# Patient Record
Sex: FEMALE | Race: WHITE | Employment: OTHER | URBAN - METROPOLITAN AREA
[De-identification: names, ages, dates, MRNs, and addresses within clinical notes are randomized per-mention and may not be internally consistent; named-entity substitution may affect disease eponyms.]

---

## 2020-10-06 ENCOUNTER — HOSPITAL ENCOUNTER (EMERGENCY)
Age: 25
Discharge: HOME | End: 2020-10-06
Payer: MEDICAID

## 2020-10-06 VITALS
OXYGEN SATURATION: 95 % | WEIGHT: 130 LBS | BODY MASS INDEX: 19.7 KG/M2 | HEART RATE: 82 BPM | HEIGHT: 68 IN | SYSTOLIC BLOOD PRESSURE: 124 MMHG | RESPIRATION RATE: 14 BRPM | DIASTOLIC BLOOD PRESSURE: 84 MMHG | TEMPERATURE: 97.6 F

## 2020-10-06 PROCEDURE — 99282 EMERGENCY DEPT VISIT SF MDM: CPT

## 2020-10-06 PROCEDURE — 90471 IMMUNIZATION ADMIN: CPT | Performed by: NURSE PRACTITIONER

## 2020-10-06 PROCEDURE — 99283 EMERGENCY DEPT VISIT LOW MDM: CPT

## 2020-10-06 PROCEDURE — 12001 RPR S/N/AX/GEN/TRNK 2.5CM/<: CPT

## 2020-10-06 PROCEDURE — 6370000000 HC RX 637 (ALT 250 FOR IP): Performed by: NURSE PRACTITIONER

## 2020-10-06 PROCEDURE — 6360000002 HC RX W HCPCS: Performed by: NURSE PRACTITIONER

## 2020-10-06 PROCEDURE — 90715 TDAP VACCINE 7 YRS/> IM: CPT | Performed by: NURSE PRACTITIONER

## 2020-10-06 PROCEDURE — 2500000003 HC RX 250 WO HCPCS: Performed by: NURSE PRACTITIONER

## 2020-10-06 RX ORDER — LIDOCAINE HYDROCHLORIDE 10 MG/ML
5 INJECTION, SOLUTION INFILTRATION; PERINEURAL ONCE
Status: COMPLETED | OUTPATIENT
Start: 2020-10-06 | End: 2020-10-06

## 2020-10-06 RX ORDER — DIAPER,BRIEF,INFANT-TODD,DISP
EACH MISCELLANEOUS ONCE
Status: COMPLETED | OUTPATIENT
Start: 2020-10-06 | End: 2020-10-06

## 2020-10-06 RX ADMIN — LIDOCAINE HYDROCHLORIDE 5 ML: 10 INJECTION, SOLUTION INFILTRATION; PERINEURAL at 21:50

## 2020-10-06 RX ADMIN — TETANUS TOXOID, REDUCED DIPHTHERIA TOXOID AND ACELLULAR PERTUSSIS VACCINE, ADSORBED 0.5 ML: 5; 2.5; 8; 8; 2.5 SUSPENSION INTRAMUSCULAR at 21:51

## 2020-10-06 RX ADMIN — BACITRACIN ZINC: 500 OINTMENT TOPICAL at 21:51

## 2020-10-06 SDOH — HEALTH STABILITY: MENTAL HEALTH: HOW OFTEN DO YOU HAVE A DRINK CONTAINING ALCOHOL?: NEVER

## 2020-10-06 ASSESSMENT — PAIN SCALES - GENERAL
PAINLEVEL_OUTOF10: 9
PAINLEVEL_OUTOF10: 9

## 2020-10-06 ASSESSMENT — PAIN DESCRIPTION - PAIN TYPE: TYPE: ACUTE PAIN

## 2020-10-06 ASSESSMENT — PAIN DESCRIPTION - DESCRIPTORS: DESCRIPTORS: CRUSHING

## 2020-10-06 ASSESSMENT — PAIN DESCRIPTION - FREQUENCY: FREQUENCY: CONTINUOUS

## 2020-10-06 ASSESSMENT — PAIN DESCRIPTION - ORIENTATION: ORIENTATION: RIGHT

## 2020-10-06 ASSESSMENT — PAIN DESCRIPTION - LOCATION: LOCATION: HAND

## 2020-10-07 NOTE — ED PROVIDER NOTES
Department of Emergency Medicine  ED Provider Note  Admit Date: 10/6/2020  Room: [address] [place] [address]      Independent       HPI:  10/6/20, Time: 9:45 PM EDT  . Elli De Luna is a 30 y.o. old female presenting to the emergency department for a laceration to the top of the right hand, caused by knife, dirty, which occured 1 hour(s) prior to arrival. There is not a possibility of retained foreign body in the affected area. The patients tetanus status is unknown. Bleeding is  controlled. There is no pain at injury site. The injury was not work related. Patient presents to the emergency department with laceration to the top of the right hand. Patient reports that she was trying to opening up a can but did not have a can opener due to recently moving so she used a knife causing her to slide and struck the top of her right hand. Bleeding controlled on arrival here. Patient unaware when her last tetanus immunization was. Review of Systems:   Pertinent positives and negatives are stated within HPI, all other systems reviewed and are negative.          --------------------------------------------- PAST HISTORY ---------------------------------------------  Past Medical History:  has no past medical history on file. Past Surgical History:  has no past surgical history on file. Social History:  reports that she has never smoked. She has never used smokeless tobacco. She reports that she does not drink alcohol or use drugs. Family History: family history is not on file. The patients home medications have been reviewed. Allergies: Patient has no known allergies. -------------------------------------------------- RESULTS -------------------------------------------------  All laboratory and radiology results have been personally reviewed by myself   LABS:  No results found for this visit on 10/06/20.     RADIOLOGY:  Interpreted by Radiologist.  No orders to display       ------------------------- NURSING NOTES AND VITALS REVIEWED ---------------------------   The nursing notes within the ED encounter and vital signs as below have been reviewed. /84   Pulse 82   Temp 97.6 °F (36.4 °C) (Temporal)   Resp 14   Ht 5' 8\" (1.727 m)   Wt 130 lb (59 kg)   SpO2 95%   BMI 19.77 kg/m²   Oxygen Saturation Interpretation: Normal      ---------------------------------------------------PHYSICAL EXAM--------------------------------------      Constitutional/General: Alert and oriented x3, well appearing, non toxic in NAD  Head: Normocephalic and atraumatic  Eyes: PERRL, EOMI  Mouth: Oropharynx clear, handling secretions, no trismus  Neck: Supple, full ROM,   Pulmonary: Lungs clear to auscultation bilaterally, no wheezes, rales, or rhonchi. Not in respiratory distress  Cardiovascular:  Regular rate and rhythm, no murmurs, gallops, or rubs. 2+ distal pulses  Abdomen: Soft, non tender, non distended,   Extremities: Moves all extremities x 4. Warm and well perfused  Skin: warm and dry without rash. There is a laceration of the top of the right hand, which measures 1cm. It is a partial nail thickness laceration. There is no evidence of foreign body or gross contamination. Neurologic: GCS 15,  Psych: Normal Affect      ------------------------------ ED COURSE/MEDICAL DECISION MAKING----------------------  Medications   Tetanus-Diphth-Acell Pertussis (BOOSTRIX) injection 0.5 mL (0.5 mLs Intramuscular Given 10/6/20 2151)   lidocaine 1 % injection 5 mL (5 mLs Intradermal Given 10/6/20 2150)   bacitracin zinc ointment ( Topical Given 10/6/20 5493)             LACERATION REPAIR  PROCEDURE NOTE:  Unless otherwise indicated, this procedure was performed by Ruth Ann Queen CNP       Laceration #: 1. Location: Dorsum of the right hand  Length: 1cm. The wound area was irrigated with sterile saline, cleansend with shur-clens and draped in a sterile fashion.   The wound area was anesthetized with Lidocaine 1% without epinephrine. WOUND COMPLEXITY:    Debridement: partial thickness and None. Undermining: partial thickness and None. Wound Margins Revised: yes. Flaps Aligned: yes. The wound was explored with the following results No foreign bodies found, no foreign body or tendon injury seen. The wound was closed with 4-0 Prolene using interrupted sutures. Dressing:  bacitracin and a sterile dressing was placed. Total number suture: 2      Medical Decision Making: . Patient presents to the emergency department with laceration to the top of the right hand. Patient reports that she was trying to opening up a can but did not have a can opener due to recently moving so she used a knife causing her to slide and struck the top of her right hand. Bleeding controlled on arrival here. Patient unaware when her last tetanus immunization was. Patient with suture repair completed by this provider, 2 sutures placed. Patient educated on daily wound care, signs symptoms of infection suture removal all with understanding. She can take Motrin or Tylenol for additional pain relief. Patient also neurovascular intact she does have full motor movement no unusual pain or paresthesia noted. Patient expressed understanding and safely discharged home            Counseling: The emergency provider has spoken with the patient and discussed todays results, in addition to providing specific details for the plan of care and counseling regarding the diagnosis and prognosis. Questions are answered at this time and they are agreeable with the plan.      --------------------------------- IMPRESSION AND DISPOSITION ---------------------------------    IMPRESSION  1. Laceration of right hand without foreign body, initial encounter    2.  Need for tetanus booster        DISPOSITION  Disposition: Discharge to home  Patient condition is good         Verner Rotunda, APRN - CNP  10/07/20 0403  ATTENDING PROVIDER ATTESTATION:     Supervising Physician, on-site, available for consultation, non-participatory in the evaluation or care of this patient       Renzo Harmon MD  10/07/20 1093

## 2022-01-06 ENCOUNTER — HOSPITAL ENCOUNTER (INPATIENT)
Age: 27
LOS: 4 days | Discharge: HOME | DRG: 753 | End: 2022-01-10
Attending: EMERGENCY MEDICINE | Admitting: PSYCHIATRY & NEUROLOGY
Payer: COMMERCIAL

## 2022-01-06 ENCOUNTER — APPOINTMENT (OUTPATIENT)
Dept: GENERAL RADIOLOGY | Age: 27
DRG: 753 | End: 2022-01-06
Payer: COMMERCIAL

## 2022-01-06 DIAGNOSIS — R45.851 SUICIDAL IDEATION: Primary | ICD-10-CM

## 2022-01-06 DIAGNOSIS — N63.0 BREAST MASS IN FEMALE: ICD-10-CM

## 2022-01-06 DIAGNOSIS — S16.1XXA ACUTE STRAIN OF NECK MUSCLE, INITIAL ENCOUNTER: ICD-10-CM

## 2022-01-06 DIAGNOSIS — S39.012A STRAIN OF LUMBAR REGION, INITIAL ENCOUNTER: ICD-10-CM

## 2022-01-06 LAB
ACETAMINOPHEN LEVEL: <5 MCG/ML (ref 10–30)
ALBUMIN SERPL-MCNC: 4.3 G/DL (ref 3.5–5.2)
ALP BLD-CCNC: 62 U/L (ref 35–104)
ALT SERPL-CCNC: 9 U/L (ref 0–32)
AMPHETAMINE SCREEN, URINE: NOT DETECTED
ANION GAP SERPL CALCULATED.3IONS-SCNC: 11 MMOL/L (ref 7–16)
AST SERPL-CCNC: 10 U/L (ref 0–31)
BACTERIA: ABNORMAL /HPF
BARBITURATE SCREEN URINE: NOT DETECTED
BASOPHILS ABSOLUTE: 0.03 E9/L (ref 0–0.2)
BASOPHILS RELATIVE PERCENT: 0.3 % (ref 0–2)
BENZODIAZEPINE SCREEN, URINE: NOT DETECTED
BILIRUB SERPL-MCNC: 0.3 MG/DL (ref 0–1.2)
BILIRUBIN URINE: NEGATIVE
BLOOD, URINE: ABNORMAL
BUN BLDV-MCNC: 8 MG/DL (ref 6–20)
CALCIUM SERPL-MCNC: 8.9 MG/DL (ref 8.6–10.2)
CANNABINOID SCREEN URINE: POSITIVE
CHLORIDE BLD-SCNC: 109 MMOL/L (ref 98–107)
CLARITY: ABNORMAL
CO2: 20 MMOL/L (ref 22–29)
COCAINE METABOLITE SCREEN URINE: NOT DETECTED
COLOR: YELLOW
CREAT SERPL-MCNC: 0.7 MG/DL (ref 0.5–1)
EOSINOPHILS ABSOLUTE: 0.07 E9/L (ref 0.05–0.5)
EOSINOPHILS RELATIVE PERCENT: 0.8 % (ref 0–6)
ETHANOL: <10 MG/DL (ref 0–0.08)
FENTANYL SCREEN, URINE: NOT DETECTED
GFR AFRICAN AMERICAN: >60
GFR NON-AFRICAN AMERICAN: >60 ML/MIN/1.73
GLUCOSE BLD-MCNC: 99 MG/DL (ref 74–99)
GLUCOSE URINE: NEGATIVE MG/DL
HCG, URINE, POC: NEGATIVE
HCT VFR BLD CALC: 43.4 % (ref 34–48)
HEMOGLOBIN: 14.9 G/DL (ref 11.5–15.5)
IMMATURE GRANULOCYTES #: 0.03 E9/L
IMMATURE GRANULOCYTES %: 0.3 % (ref 0–5)
INFLUENZA A: NOT DETECTED
INFLUENZA B: NOT DETECTED
KETONES, URINE: NEGATIVE MG/DL
LEUKOCYTE ESTERASE, URINE: NEGATIVE
LYMPHOCYTES ABSOLUTE: 1.67 E9/L (ref 1.5–4)
LYMPHOCYTES RELATIVE PERCENT: 18.5 % (ref 20–42)
Lab: ABNORMAL
Lab: NORMAL
MCH RBC QN AUTO: 31.8 PG (ref 26–35)
MCHC RBC AUTO-ENTMCNC: 34.3 % (ref 32–34.5)
MCV RBC AUTO: 92.5 FL (ref 80–99.9)
METHADONE SCREEN, URINE: NOT DETECTED
MONOCYTES ABSOLUTE: 0.71 E9/L (ref 0.1–0.95)
MONOCYTES RELATIVE PERCENT: 7.9 % (ref 2–12)
NEGATIVE QC PASS/FAIL: NORMAL
NEUTROPHILS ABSOLUTE: 6.5 E9/L (ref 1.8–7.3)
NEUTROPHILS RELATIVE PERCENT: 72.2 % (ref 43–80)
NITRITE, URINE: NEGATIVE
OPIATE SCREEN URINE: NOT DETECTED
OXYCODONE URINE: NOT DETECTED
PDW BLD-RTO: 12.7 FL (ref 11.5–15)
PH UA: 7.5 (ref 5–9)
PHENCYCLIDINE SCREEN URINE: NOT DETECTED
PLATELET # BLD: 294 E9/L (ref 130–450)
PMV BLD AUTO: 10 FL (ref 7–12)
POSITIVE QC PASS/FAIL: NORMAL
POTASSIUM SERPL-SCNC: 4 MMOL/L (ref 3.5–5)
PROTEIN UA: ABNORMAL MG/DL
RBC # BLD: 4.69 E12/L (ref 3.5–5.5)
RBC UA: ABNORMAL /HPF (ref 0–2)
SALICYLATE, SERUM: <0.3 MG/DL (ref 0–30)
SARS-COV-2 RNA, RT PCR: NOT DETECTED
SODIUM BLD-SCNC: 140 MMOL/L (ref 132–146)
SPECIFIC GRAVITY UA: 1.01 (ref 1–1.03)
TOTAL PROTEIN: 7.2 G/DL (ref 6.4–8.3)
TRICYCLIC ANTIDEPRESSANTS SCREEN SERUM: NEGATIVE NG/ML
TROPONIN, HIGH SENSITIVITY: <6 NG/L (ref 0–9)
UROBILINOGEN, URINE: 0.2 E.U./DL
WBC # BLD: 9 E9/L (ref 4.5–11.5)
WBC UA: ABNORMAL /HPF (ref 0–5)

## 2022-01-06 PROCEDURE — 6370000000 HC RX 637 (ALT 250 FOR IP): Performed by: EMERGENCY MEDICINE

## 2022-01-06 PROCEDURE — 93005 ELECTROCARDIOGRAM TRACING: CPT

## 2022-01-06 PROCEDURE — 80143 DRUG ASSAY ACETAMINOPHEN: CPT

## 2022-01-06 PROCEDURE — 80053 COMPREHEN METABOLIC PANEL: CPT

## 2022-01-06 PROCEDURE — 85025 COMPLETE CBC W/AUTO DIFF WBC: CPT

## 2022-01-06 PROCEDURE — 82077 ASSAY SPEC XCP UR&BREATH IA: CPT

## 2022-01-06 PROCEDURE — 72110 X-RAY EXAM L-2 SPINE 4/>VWS: CPT

## 2022-01-06 PROCEDURE — 81001 URINALYSIS AUTO W/SCOPE: CPT

## 2022-01-06 PROCEDURE — 99285 EMERGENCY DEPT VISIT HI MDM: CPT

## 2022-01-06 PROCEDURE — 72050 X-RAY EXAM NECK SPINE 4/5VWS: CPT

## 2022-01-06 PROCEDURE — 6370000000 HC RX 637 (ALT 250 FOR IP)

## 2022-01-06 PROCEDURE — 80307 DRUG TEST PRSMV CHEM ANLYZR: CPT

## 2022-01-06 PROCEDURE — 80179 DRUG ASSAY SALICYLATE: CPT

## 2022-01-06 PROCEDURE — 87636 SARSCOV2 & INF A&B AMP PRB: CPT

## 2022-01-06 PROCEDURE — 84484 ASSAY OF TROPONIN QUANT: CPT

## 2022-01-06 PROCEDURE — 1240000000 HC EMOTIONAL WELLNESS R&B

## 2022-01-06 RX ORDER — HALOPERIDOL 5 MG
5 TABLET ORAL EVERY 6 HOURS PRN
Status: DISCONTINUED | OUTPATIENT
Start: 2022-01-06 | End: 2022-01-10 | Stop reason: HOSPADM

## 2022-01-06 RX ORDER — TRAZODONE HYDROCHLORIDE 50 MG/1
50 TABLET ORAL NIGHTLY PRN
Status: DISCONTINUED | OUTPATIENT
Start: 2022-01-06 | End: 2022-01-10 | Stop reason: HOSPADM

## 2022-01-06 RX ORDER — ACETAMINOPHEN 325 MG/1
650 TABLET ORAL EVERY 6 HOURS PRN
Status: DISCONTINUED | OUTPATIENT
Start: 2022-01-06 | End: 2022-01-06 | Stop reason: SDUPTHER

## 2022-01-06 RX ORDER — NICOTINE 21 MG/24HR
1 PATCH, TRANSDERMAL 24 HOURS TRANSDERMAL DAILY
Status: DISCONTINUED | OUTPATIENT
Start: 2022-01-07 | End: 2022-01-07 | Stop reason: SDUPTHER

## 2022-01-06 RX ORDER — IBUPROFEN 600 MG/1
600 TABLET ORAL EVERY 6 HOURS PRN
Status: DISCONTINUED | OUTPATIENT
Start: 2022-01-06 | End: 2022-01-10 | Stop reason: HOSPADM

## 2022-01-06 RX ORDER — MAGNESIUM HYDROXIDE/ALUMINUM HYDROXICE/SIMETHICONE 120; 1200; 1200 MG/30ML; MG/30ML; MG/30ML
30 SUSPENSION ORAL PRN
Status: DISCONTINUED | OUTPATIENT
Start: 2022-01-06 | End: 2022-01-10 | Stop reason: HOSPADM

## 2022-01-06 RX ORDER — TRAZODONE HYDROCHLORIDE 50 MG/1
50 TABLET ORAL NIGHTLY PRN
Status: DISCONTINUED | OUTPATIENT
Start: 2022-01-07 | End: 2022-01-06

## 2022-01-06 RX ORDER — HYDROXYZINE PAMOATE 50 MG/1
50 CAPSULE ORAL 3 TIMES DAILY PRN
Status: DISCONTINUED | OUTPATIENT
Start: 2022-01-06 | End: 2022-01-10 | Stop reason: HOSPADM

## 2022-01-06 RX ORDER — IBUPROFEN 400 MG/1
600 TABLET ORAL ONCE
Status: COMPLETED | OUTPATIENT
Start: 2022-01-06 | End: 2022-01-06

## 2022-01-06 RX ORDER — HALOPERIDOL 5 MG/ML
5 INJECTION INTRAMUSCULAR EVERY 6 HOURS PRN
Status: DISCONTINUED | OUTPATIENT
Start: 2022-01-06 | End: 2022-01-10 | Stop reason: HOSPADM

## 2022-01-06 RX ORDER — ACETAMINOPHEN 500 MG
1000 TABLET ORAL ONCE
Status: COMPLETED | OUTPATIENT
Start: 2022-01-06 | End: 2022-01-06

## 2022-01-06 RX ADMIN — IBUPROFEN 600 MG: 400 TABLET, FILM COATED ORAL at 21:09

## 2022-01-06 RX ADMIN — ACETAMINOPHEN 1000 MG: 500 TABLET ORAL at 16:00

## 2022-01-06 ASSESSMENT — PAIN SCALES - GENERAL
PAINLEVEL_OUTOF10: 8
PAINLEVEL_OUTOF10: 7
PAINLEVEL_OUTOF10: 8

## 2022-01-06 ASSESSMENT — SLEEP AND FATIGUE QUESTIONNAIRES
DIFFICULTY ARISING: YES
DIFFICULTY FALLING ASLEEP: YES
SLEEP PATTERN: INSOMNIA
DO YOU HAVE DIFFICULTY SLEEPING: YES
DIFFICULTY STAYING ASLEEP: YES
DO YOU USE A SLEEP AID: NO
RESTFUL SLEEP: NO
AVERAGE NUMBER OF SLEEP HOURS: 2

## 2022-01-06 ASSESSMENT — LIFESTYLE VARIABLES: HISTORY_ALCOHOL_USE: NO

## 2022-01-06 ASSESSMENT — PAIN DESCRIPTION - LOCATION: LOCATION: NECK

## 2022-01-06 ASSESSMENT — PATIENT HEALTH QUESTIONNAIRE - PHQ9: SUM OF ALL RESPONSES TO PHQ QUESTIONS 1-9: 27

## 2022-01-06 NOTE — ED PROVIDER NOTES
ED Attending shared visit  CC: No      Department of Emergency Medicine  ED Provider Note  Admit Date: 1/6/2022  Room: [address] [place] [address]          1/6/22  3:56 PM EST    HPI: Jose Francisco Matos 27 y.o. female presents with a complaint of depression, suicidal thoughts, neck pain and low back pain beginning 2 days ago. Complaint has been constant and became more severe today which is what prompted the visit. Is having Suicidal ideation. Denies Homicidal ideation. Has no specific plan. Presents by EMS for complaints of neck pain, low back pain, concerning regarding mass to the bilateral breast that she palpated 2 days ago and also having worsening depression and suicidal thoughts. She has no clear plan. Denies any alcohol or substance abuse. Denies any chest.  Denies any fever, body aches or chills. Review of Systems:   Pertinent positives and negatives are stated within HPI, all other systems reviewed and are negative.      --------------------------------------------- PAST HISTORY ---------------------------------------------  Past Medical History:  has no past medical history on file. Past Surgical History:  has no past surgical history on file. Social History:  reports that she has never smoked. She has never used smokeless tobacco. She reports current alcohol use. She reports that she does not use drugs. Family History: family history is not on file. The patients home medications have been reviewed. Allergies: Patient has no known allergies. -------------------------------------------------- RESULTS -------------------------------------------------  All laboratory and imaging studies were reviewed by myself.     LABS:  Results for orders placed or performed during the hospital encounter of 01/06/22   COVID-19 & Influenza Combo    Specimen: Nasopharyngeal Swab   Result Value Ref Range    SARS-CoV-2 RNA, RT PCR NOT DETECTED NOT DETECTED    INFLUENZA A NOT DETECTED NOT DETECTED    INFLUENZA B NOT DETECTED NOT DETECTED   Comprehensive Metabolic Panel   Result Value Ref Range    Sodium 140 132 - 146 mmol/L    Potassium 4.0 3.5 - 5.0 mmol/L    Chloride 109 (H) 98 - 107 mmol/L    CO2 20 (L) 22 - 29 mmol/L    Anion Gap 11 7 - 16 mmol/L    Glucose 99 74 - 99 mg/dL    BUN 8 6 - 20 mg/dL    CREATININE 0.7 0.5 - 1.0 mg/dL    GFR Non-African American >60 >=60 mL/min/1.73    GFR African American >60     Calcium 8.9 8.6 - 10.2 mg/dL    Total Protein 7.2 6.4 - 8.3 g/dL    Albumin 4.3 3.5 - 5.2 g/dL    Total Bilirubin 0.3 0.0 - 1.2 mg/dL    Alkaline Phosphatase 62 35 - 104 U/L    ALT 9 0 - 32 U/L    AST 10 0 - 31 U/L   Urine Drug Screen   Result Value Ref Range    Drug Screen Comment: see below    Serum Drug Screen   Result Value Ref Range    Ethanol Lvl <10 mg/dL    Acetaminophen Level <5.0 (L) 10.0 - 02.1 mcg/mL    Salicylate, Serum <1.0 0.0 - 30.0 mg/dL    TCA Scrn NEGATIVE Cutoff:300 ng/mL   CBC Auto Differential   Result Value Ref Range    WBC 9.0 4.5 - 11.5 E9/L    RBC 4.69 3.50 - 5.50 E12/L    Hemoglobin 14.9 11.5 - 15.5 g/dL    Hematocrit 43.4 34.0 - 48.0 %    MCV 92.5 80.0 - 99.9 fL    MCH 31.8 26.0 - 35.0 pg    MCHC 34.3 32.0 - 34.5 %    RDW 12.7 11.5 - 15.0 fL    Platelets 442 202 - 249 E9/L    MPV 10.0 7.0 - 12.0 fL    Neutrophils % 72.2 43.0 - 80.0 %    Immature Granulocytes % 0.3 0.0 - 5.0 %    Lymphocytes % 18.5 (L) 20.0 - 42.0 %    Monocytes % 7.9 2.0 - 12.0 %    Eosinophils % 0.8 0.0 - 6.0 %    Basophils % 0.3 0.0 - 2.0 %    Neutrophils Absolute 6.50 1.80 - 7.30 E9/L    Immature Granulocytes # 0.03 E9/L    Lymphocytes Absolute 1.67 1.50 - 4.00 E9/L    Monocytes Absolute 0.71 0.10 - 0.95 E9/L    Eosinophils Absolute 0.07 0.05 - 0.50 E9/L    Basophils Absolute 0.03 0.00 - 0.20 E9/L   Troponin   Result Value Ref Range    Troponin, High Sensitivity <6 0 - 9 ng/L   Urinalysis with Microscopic   Result Value Ref Range    Color, UA Yellow Straw/Yellow    Clarity, UA SL CLOUDY Clear    Glucose, Ur Negative Negative mg/dL    Bilirubin Urine Negative Negative    Ketones, Urine Negative Negative mg/dL    Specific Gravity, UA 1.015 1.005 - 1.030    Blood, Urine TRACE-INTACT Negative    pH, UA 7.5 5.0 - 9.0    Protein, UA TRACE Negative mg/dL    Urobilinogen, Urine 0.2 <2.0 E.U./dL    Nitrite, Urine Negative Negative    Leukocyte Esterase, Urine Negative Negative    WBC, UA 0-1 0 - 5 /HPF    RBC, UA 1-3 0 - 2 /HPF    Bacteria, UA RARE (A) None Seen /HPF   POC Pregnancy Urine   Result Value Ref Range    HCG, Urine, POC Negative Negative    Lot Number [id number]     Positive QC Pass/Fail Pass     Negative QC Pass/Fail Pass        RADIOLOGY:  Interpreted by Radiologist.  XR CERVICAL SPINE (4-5 VIEWS)    (Results Pending)   XR LUMBAR SPINE (MIN 4 VIEWS)    (Results Pending)       EKG Interpretation  Interpreted by emergency department physician    Rhythm: normal sinus   Rate: normal  Axis: normal  Conduction: normal  ST Segments: no acute change  T Waves: no acute change    Clinical Impression: no acute changes  Comparison to Old EKG          ------------------------- NURSING NOTES AND VITALS REVIEWED ---------------------------   The nursing notes within the ED encounter and vital signs as below have been reviewed. BP (!) 123/59   Pulse 90   Temp 98 °F (36.7 °C)   Resp 16   Wt 135 lb (61.2 kg)   LMP 12/23/2021   SpO2 98%   BMI 20.53 kg/m²   Oxygen Saturation Interpretation: Normal            ---------------------------------------------------PHYSICAL EXAM--------------------------------------      Constitutional/General: Alert and oriented x3, well appearing, non toxic in NAD  Head: Normocephalic, atraumatic  Eyes: PERRL, EOMI  Mouth: Oropharynx clear, handling secretions, no trismus  Neck: Supple, full ROM, tender to palpation in the midline, no stridor, no crepitus, no meningeal signs,  no radicular symptoms to the upper extremities.     Pulmonary: Lungs clear to auscultation bilaterally, no wheezes, rales, or rhonchi. Not in respiratory distress  Cardiovascular:  Regular rate and rhythm, no murmurs, gallops, or rubs. 2+ distal pulses  Abdomen: Soft, non tender, non distended, +BS, no rebound, guarding, or rigidity. No pulsatile masses appreciated  Extremities: Moves all extremities x 4. Warm and well perfused, no clubbing, cyanosis, or edema. Capillary refill <3 seconds. No tenderness on palpation to the midline of the lumbar spine or radicular symptoms to the lower extremities. Negative straight leg raise bilaterally. Skin: warm and dry without rash, has small nonindurated nonfluctuant but movable less than 1 cm diameter palpable mass to the left areola as well as to the medial aspect of the right breast.  No surrounding erythema no induration or fluctuance noted. No concerning for an abscess. Neurologic: GCS 15, CN 2-12 grossly intact, no focal deficits, symmetric strength 5/5 in the upper and lower extremities bilaterally  Psych: Flat affect      ------------------------------------------ PROGRESS NOTES ------------------------------------------     Medical decision making:  Patient was examined by Dr. Maryam Carvalho she is medically clear for social service evaluation and disposition per the recommendation. Consultations:   Social work     Re-Evaluations:        Counseling: The emergency provider has spoken with thepatient and discussed todays results, in addition to providing specific details for the plan of care and counseling regarding the diagnosis and prognosis. Questions are answered at this time and they are agreeable with the plan.         --------------------------------- IMPRESSION AND DISPOSITION ---------------------------------    IMPRESSION  1. Suicidal ideation    2. Acute strain of neck muscle, initial encounter    3. Strain of lumbar region, initial encounter    4.  Breast mass in female        DISPOSITION  Disposition: as per consultation   Patient condition is stable             Tiesha Mckeon, CHANNING - CNP  01/06/22 6849

## 2022-01-06 NOTE — ED NOTES
Pt to ED for back pain and newly found lumps on breasts. Pt has hx of breast cancer in family and states he stress and anxiety of events are causing her suicidal ideation. Pt has a history of self harm with \"sharp objects\". Pt does not endorse specific plan but states that she would probably attempt cutting/self injurious objects. Pt denies HI/AH/VH. Pt is also noting back pain & denies and falls/trauma. A&o x4. Calm and coopertive.       Miranda Ledbetter RN  01/06/22 8917

## 2022-01-07 PROBLEM — R45.851 SUICIDAL IDEATION: Status: RESOLVED | Noted: 2022-01-06 | Resolved: 2022-01-07

## 2022-01-07 PROBLEM — F31.81 MIXED BIPOLAR II DISORDER (HCC): Status: ACTIVE | Noted: 2022-01-07

## 2022-01-07 LAB
EKG ATRIAL RATE: 92 BPM
EKG P AXIS: 76 DEGREES
EKG P-R INTERVAL: 118 MS
EKG Q-T INTERVAL: 348 MS
EKG QRS DURATION: 82 MS
EKG QTC CALCULATION (BAZETT): 430 MS
EKG R AXIS: 80 DEGREES
EKG T AXIS: 24 DEGREES
EKG VENTRICULAR RATE: 92 BPM

## 2022-01-07 PROCEDURE — 6370000000 HC RX 637 (ALT 250 FOR IP): Performed by: NURSE PRACTITIONER

## 2022-01-07 PROCEDURE — 1240000000 HC EMOTIONAL WELLNESS R&B

## 2022-01-07 PROCEDURE — 6370000000 HC RX 637 (ALT 250 FOR IP): Performed by: PSYCHIATRY & NEUROLOGY

## 2022-01-07 PROCEDURE — 99222 1ST HOSP IP/OBS MODERATE 55: CPT | Performed by: NURSE PRACTITIONER

## 2022-01-07 RX ORDER — OLANZAPINE 5 MG/1
5 TABLET ORAL NIGHTLY
Status: DISCONTINUED | OUTPATIENT
Start: 2022-01-07 | End: 2022-01-10 | Stop reason: HOSPADM

## 2022-01-07 RX ORDER — DIVALPROEX SODIUM 250 MG/1
250 TABLET, DELAYED RELEASE ORAL EVERY 12 HOURS SCHEDULED
Status: DISCONTINUED | OUTPATIENT
Start: 2022-01-07 | End: 2022-01-10 | Stop reason: HOSPADM

## 2022-01-07 RX ORDER — POLYETHYLENE GLYCOL 3350 17 G
2 POWDER IN PACKET (EA) ORAL
Status: DISCONTINUED | OUTPATIENT
Start: 2022-01-07 | End: 2022-01-10 | Stop reason: HOSPADM

## 2022-01-07 RX ADMIN — NICOTINE POLACRILEX 2 MG: 2 LOZENGE ORAL at 12:49

## 2022-01-07 RX ADMIN — HYDROXYZINE PAMOATE 50 MG: 25 CAPSULE ORAL at 00:19

## 2022-01-07 RX ADMIN — NICOTINE POLACRILEX 2 MG: 2 LOZENGE ORAL at 17:55

## 2022-01-07 RX ADMIN — TRAZODONE HYDROCHLORIDE 50 MG: 50 TABLET ORAL at 00:19

## 2022-01-07 RX ADMIN — OLANZAPINE 5 MG: 5 TABLET, FILM COATED ORAL at 21:47

## 2022-01-07 RX ADMIN — NICOTINE POLACRILEX 2 MG: 2 LOZENGE ORAL at 00:19

## 2022-01-07 RX ADMIN — DIVALPROEX SODIUM 250 MG: 250 TABLET, DELAYED RELEASE ORAL at 21:46

## 2022-01-07 RX ADMIN — IBUPROFEN 600 MG: 600 TABLET, FILM COATED ORAL at 10:25

## 2022-01-07 ASSESSMENT — PAIN SCALES - GENERAL
PAINLEVEL_OUTOF10: 2
PAINLEVEL_OUTOF10: 0
PAINLEVEL_OUTOF10: 0
PAINLEVEL_OUTOF10: 2
PAINLEVEL_OUTOF10: 0

## 2022-01-07 ASSESSMENT — PAIN DESCRIPTION - PAIN TYPE
TYPE: CHRONIC PAIN
TYPE: CHRONIC PAIN

## 2022-01-07 ASSESSMENT — SLEEP AND FATIGUE QUESTIONNAIRES
AVERAGE NUMBER OF SLEEP HOURS: 4
DO YOU USE A SLEEP AID: NO
DIFFICULTY FALLING ASLEEP: YES
DIFFICULTY ARISING: YES
RESTFUL SLEEP: NO
DIFFICULTY STAYING ASLEEP: YES
SLEEP PATTERN: RESTLESSNESS;DIFFICULTY FALLING ASLEEP;DIFFICULTY ARISING;DISTURBED/INTERRUPTED SLEEP
DO YOU HAVE DIFFICULTY SLEEPING: YES

## 2022-01-07 ASSESSMENT — PATIENT HEALTH QUESTIONNAIRE - PHQ9
SUM OF ALL RESPONSES TO PHQ QUESTIONS 1-9: 12
SUM OF ALL RESPONSES TO PHQ QUESTIONS 1-9: 16

## 2022-01-07 ASSESSMENT — LIFESTYLE VARIABLES: HISTORY_ALCOHOL_USE: NO

## 2022-01-07 ASSESSMENT — PAIN DESCRIPTION - DESCRIPTORS: DESCRIPTORS: ACHING

## 2022-01-07 ASSESSMENT — PAIN DESCRIPTION - LOCATION
LOCATION: BACK
LOCATION: BACK

## 2022-01-07 ASSESSMENT — PAIN DESCRIPTION - ONSET
ONSET: ON-GOING
ONSET: ON-GOING

## 2022-01-07 ASSESSMENT — PAIN DESCRIPTION - PROGRESSION
CLINICAL_PROGRESSION: GRADUALLY WORSENING
CLINICAL_PROGRESSION: NOT CHANGED

## 2022-01-07 ASSESSMENT — PAIN DESCRIPTION - ORIENTATION
ORIENTATION: LOWER
ORIENTATION: LOWER

## 2022-01-07 ASSESSMENT — PAIN - FUNCTIONAL ASSESSMENT: PAIN_FUNCTIONAL_ASSESSMENT: ACTIVITIES ARE NOT PREVENTED

## 2022-01-07 NOTE — ED NOTES
ALANA MERRITT called [facility] and spoke with intake and was informed there are 14 people on their waiting list at this time.     ALANA MERRITT faxed facesheet per protocol      GIAN Krishna, WALTERW  01/06/22 7142

## 2022-01-07 NOTE — PROGRESS NOTES
PT. HAS BEEN UP ON UNIT, IN GOOD CONTROL WITH NO SELF HARM. PT. DENIES SUICIDAL IDEATIONS, HOMICIDAL IDEATIONS AND HALLUCINATIONS. PT. REPORTS POTENTIAL TO BE MEDICATION COMPLIANT. PT. ATTENDS [facility] [employer] AND HAS ADJUSTED WELL TO UNIT.

## 2022-01-07 NOTE — PLAN OF CARE
Problem: Altered Mood, Depressive Behavior:  Goal: Absence of self-harm  Description: Absence of self-harm  Outcome: Met This Shift  NO SELF HARM. Problem: Altered Mood, Depressive Behavior:  Goal: Able to verbalize and/or display a decrease in depressive symptoms  Description: Able to verbalize and/or display a decrease in depressive symptoms  Outcome: Ongoing  MOOD DEPRESSED. PLEASANT ON APPROACH.

## 2022-01-07 NOTE — PLAN OF CARE
[facility]  Initial Interdisciplinary Treatment Plan NOTE    Review Date & Time: 1/7/22 0930    Patient was not in treatment team    Admission Type:   Admission Type: Inpatient    Reason for admission:  Reason for Admission: \"I have pain in my back I was at my breaking point\"      Estimated Length of Stay Update:   5 days  Estimated Discharge Date Update:  Monday    EDUCATION:   Learner Progress Toward Treatment Goals: Reviewed results and recommendations of this team    Method:  individual    Outcome: Verbalized understanding and Needs reinforcement    PATIENT GOALS: \"draw\"    PLAN/TREATMENT RECOMMENDATIONS UPDATE: initiate medications, supportive care, assess for suicide risk and self harm.  Encourage groups, discharge planning and follow up    GOALS UPDATE:   Time frame for Short-Term Goals:  5 days    Kianna Anton RN

## 2022-01-07 NOTE — ED NOTES
Per ALANA RN, patient has been accepted to [facility] by Dr. Antonia Villanueva.     Room 7521B    [facility] in admitting notified of bed assignment          GIAN Orourke, [facility]  01/06/22 3254
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
## 2022-01-07 NOTE — ED NOTES
Report given to Niall Irby on [facility]. PT placed in for transport.      Emilia Aquino RN  01/06/22 9696
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
## 2022-01-07 NOTE — BH NOTE
[facility]  Admission Note     Admitted 26 y/o w/f a/o x3 c/o depressed mood and suicidal thoughts came to the ER because she couldn't stand the pain and was arguing with her ex  \"I have back pain for years and they did xray in ER told me I have degenerative disc disease\"  Xray shows minimal degenerative disc disease no acute Fx's or dislocations pt moved from [place] with  came here because it looked like a cheap place to live she has since  her  Jayne Ross but they are back together living in apartment together they both are employed she works at the [employer] will she has been off her psych meds for 1 yr because of no insurance Hx cutting as a teen oriented to unit and room med with trazadone 50mg vistaril 50mg and commit lozenger to bed      Admission Type:   Admission Type: Inpatient    Reason for admission:  Reason for Admission: \"I have pain in my back I was at my breaking point\"    PATIENT STRENGTHS:  Strengths: Communication    Patient Strengths and Limitations:  Limitations: Difficulty problem solving/relies on others to help solve problems    Addictive Behavior:   Addictive Behavior  In the past 3 months, have you felt or has someone told you that you have a problem with:  : None  Do you have a history of Chemical Use?: No  Do you have a history of Alcohol Use?: No  Do you have a history of Street Drug Abuse?: Yes (cannabis)  Histroy of Prescripton Drug Abuse?: No    Medical Problems:   History reviewed. No pertinent past medical history.     Status EXAM:  Status and Exam  Normal: Yes  Facial Expression: Avoids Gaze  Affect: Appropriate  Level of Consciousness: Alert  Mood:Normal: No  Mood: Depressed,Anxious,Sad  Motor Activity:Normal: Yes  Preception: [place] to Person,[place] to Time,[place] to Place,[place] to Situation  Attention:Normal: Yes  Thought Processes:  (WNL)  Thought Content:Normal: Yes  Hallucinations: None  Delusions: No  Memory:Normal: Yes  Insight and Judgment: Yes  Present Suicidal Ideation: Yes  Present Homicidal Ideation: No    Tobacco Screening:  Practical Counseling, on admission, chey X, if applicable and completed (first 3 are required if patient doesn't refuse): (x )  Recognizing danger situations (included triggers and roadblocks)                    (x )  Coping skills (new ways to manage stress, exercise, relaxation techniques, changing routine, distraction)                                                           (x )  Basic information about quitting (benefits of quitting, techniques in how to quit, available resources  ( ) Referral for counseling faxed to [facility]                                           ( ) Patient refused counseling  ( ) Patient has not smoked in the last 30 days    Metabolic Screening:    No results found for: LABA1C    No results found for: CHOL  No results found for: TRIG  No results found for: HDL  No components found for: LDLCAL  No results found for: LABVLDL      Body mass index is 20.53 kg/m².     BP Readings from Last 2 Encounters:   01/06/22 126/80   10/06/20 124/84           Pt admitted with followings belongings:  Dental Appliances: None  Vision - Corrective Lenses: Glasses  Jewelry:  (belly button ring, lip piercings)  Body Piercings Removed: No  Clothing: Edita Manners / Sonjia Chroman  Were All Patient Medications Collected?: Yes  Other Valuables: Cell phone,Wallet             Rosy Olmstead RN

## 2022-01-07 NOTE — CARE COORDINATION
Elizabeth received a call from Becca Dobbs with [employer] requesting to complete a phone assessment with pt to be screened for insurance. Elizabeth transferred phone to nurses station and handed phone to pt.

## 2022-01-07 NOTE — PROGRESS NOTES
Attended morning community meeting. Updated on staffing and daily expectations. Shared goal for the day as draw.

## 2022-01-07 NOTE — ED NOTES
Emergency Department [facility] AN AFFILIATE OF [facility] Biopsychosocial Assessment Note    ALANA  met with patient to complete biopsychosocial assessment, cssrs and sbirt    Chief Complaint:     Patient is a 28 yo female presenting to the ED by EMS, voluntarily for suicidal thoughts, patient states she has been having neck pain, unable to sleep, having thoughts of suicide, denies plan. Patient reports increased depression with increasing thoughts of suicide. Patient reports no specific plan but thoughts are forming, patient did not share Patient reports feeling overwhelmed, helplessness and hopelessness. Patient reports she has been living in [place] a year and has not been able to get connected with outpatient [facility] services and has not been on her meds. Patient reports rapid thoughts, rapid mood swings and unable to focus. Patient reports no support system as her fiance gets mad when she is upset. Patient also reports back/neck pain which is not helping her mental health. Patient reports she self medicates with marijuana    MSE:    Patient is anxious, oriented x 4 and alert, Affect is labile, Mood is sad, depressed, feeling helpless/hopeless, poor judgment. Reports very poor sleep and appetite. Admits to SI, denies HI and AVH    Clinical Summary/History:     Patient reports a  hx of BiPolar 2, depression, PTSD and FRIDA. Patient reports she has been off meds and not connected to outpatient [facility] service in a year. Patient reports 1 previous suicide attempt by cutting wrist 4-5 yrs ago in [place]. Gender  [] Male [x] Female [] Transgender  [] Other    Sexual Orientation    [x] Heterosexual [] Homosexual [] Bisexual [] Other    Suicidal Behavioral: CSSR-S Complete. [x] Reports:    [x] Past [x] Present   [] Denies    Homicidal/ Violent Behavior  [] Reports:   [] Past [] Present   [x] Denies     Violence Risk Screenin. Have you ever engaged in a physical fight? []  Yes [x]  No  2.  Have you ever had an order of protection taken out against you? []  Yes [x]  No  3. Have you ever been arrested due to violence? []  Yes [x]  No  4. Have you ever been cruel to animals? []  Yes [x]  No    If any of the above questions are affirmative, please complete these questions:  1. Have you ever thought about hurting someone? []  No  []  Yes (Ask the questions listed below)   When?  Did you follow through with the thoughts? []  No  []  Yes - What happened? 2.  Have you ever threatened anyone? []  No  []  Yes (Ask the questions listed below)   When and what happened?  Have you ever threatened someone with a gun, knife or other weapon? []  No  []  Yes - When and what happened? 3. Have you ever physically hurt someone? []  No  []  Yes (Ask the questions listed below)   When and what happened?  How many times have you physically hurt someone in the past?    Hallucinations/Delusions   [] Reports:   [x] Denies     Substance Use/Alcohol Use/Addiction: SBIRT Screen Complete. [x] Reports:  Marijuana  [] Denies     Trauma History  [x] Reports:  PTSD, patient did not elaborate  [] Denies     Collateral Information:     No collateral information obtained at this time    Level of Care/Disposition Plan  [] Home:   [] Outpatient Provider:   [] Crisis Unit:   [x] Inpatient Psychiatric Unit:  [] Other:     ALANA SW will pursue inpatient admission.  Patient did sign a voluntary form     GIAN Segura, [place]  01/06/22 2055
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
## 2022-01-08 PROCEDURE — 1240000000 HC EMOTIONAL WELLNESS R&B

## 2022-01-08 PROCEDURE — 99232 SBSQ HOSP IP/OBS MODERATE 35: CPT | Performed by: NURSE PRACTITIONER

## 2022-01-08 PROCEDURE — 6370000000 HC RX 637 (ALT 250 FOR IP): Performed by: PSYCHIATRY & NEUROLOGY

## 2022-01-08 PROCEDURE — 6370000000 HC RX 637 (ALT 250 FOR IP): Performed by: NURSE PRACTITIONER

## 2022-01-08 RX ADMIN — DIVALPROEX SODIUM 250 MG: 250 TABLET, DELAYED RELEASE ORAL at 21:00

## 2022-01-08 RX ADMIN — TRAZODONE HYDROCHLORIDE 50 MG: 50 TABLET ORAL at 21:00

## 2022-01-08 RX ADMIN — NICOTINE POLACRILEX 2 MG: 2 LOZENGE ORAL at 18:29

## 2022-01-08 RX ADMIN — NICOTINE POLACRILEX 2 MG: 2 LOZENGE ORAL at 09:16

## 2022-01-08 RX ADMIN — OLANZAPINE 5 MG: 5 TABLET, FILM COATED ORAL at 21:00

## 2022-01-08 RX ADMIN — DIVALPROEX SODIUM 250 MG: 250 TABLET, DELAYED RELEASE ORAL at 09:14

## 2022-01-08 RX ADMIN — NICOTINE POLACRILEX 2 MG: 2 LOZENGE ORAL at 14:17

## 2022-01-08 ASSESSMENT — PAIN SCALES - GENERAL
PAINLEVEL_OUTOF10: 0

## 2022-01-08 NOTE — PROGRESS NOTES
.. BEHAVIORAL HEALTH FOLLOW-UP NOTE     1/8/2022     Patient was seen and examined in person, Chart reviewed   Patient's case discussed with staff/team    Patient personally seen and examined by me mental status examination performed. I agree the below assessment by the medical student. Psychomotor evaluation revealed no agitation retardation any abnormal movements. Her eye contact is fair her speech is normal rate rhythm and tone. Her mood is \"I am anxious. \"  Affect is mood congruent appropriate pleasant. Thought process is linear without flight of ideas loose associations. Thought contents devoid of any auditory visualizations delusions or other perceptual normalities. She denies suicidal homicidal ideations intent or plan her impulse control is adequate her cognitive function was to be at her baseline her insight judgment is fair she is alert oriented time place and person          Chief Complaint: \" I am still feeling anxious\"    Interim History:   The patient was seen today in the unit. She stated that she is still feeling a little anxious especially because she was woken up in the middle of the night because the nurse needed to take her blood pressure. She expressed that she has been being social and doing puzzles to decrease her anxiety and this has been working. The patient denies nay SI, HI, delusions, hallucinations, intrusive thoughts, and bad thoughts. Appetite:  [x] Normal/Unchanged  [] Increased  [] Decreased      Sleep:       [x] Normal/Unchanged  [] Fair       [] Poor              Energy:    [x] Normal/Unchanged  [] Increased  [] Decreased        SI [] Present  [x] Absent    HI  []Present  [x] Absent     Aggression:  [] yes  [x] no    Patient is [x] able  [] unable to CONTRACT FOR SAFETY     PAST MEDICAL/PSYCHIATRIC HISTORY:   History reviewed. No pertinent past medical history. FAMILY/SOCIAL HISTORY:  History reviewed. No pertinent family history.   Social History     Socioeconomic History    Marital status: Single     Spouse name: Not on file    Number of children: Not on file    Years of education: Not on file    Highest education level: Not on file   Occupational History    Not on file   Tobacco Use    Smoking status: Never Smoker    Smokeless tobacco: Never Used   Substance and Sexual Activity    Alcohol use: Yes    Drug use: Never    Sexual activity: Not on file   Other Topics Concern    Not on file   Social History Narrative    Not on file     Social Determinants of Health     Financial Resource Strain:     Difficulty of Paying Living Expenses: Not on file   Food Insecurity:     Worried About Running Out of Food in the Last Year: Not on file    Dayanara of Food in the Last Year: Not on file   Transportation Needs:     Lack of Transportation (Medical): Not on file    Lack of Transportation (Non-Medical): Not on file   Physical Activity:     Days of Exercise per Week: Not on file    Minutes of Exercise per Session: Not on file   Stress:     Feeling of Stress : Not on file   Social Connections:     Frequency of Communication with Friends and Family: Not on file    Frequency of Social Gatherings with Friends and Family: Not on file    Attends Alevism Services: Not on file    Active Member of [address] [place] [address] [employer] or Organizations: Not on file    Attends Club or Organization Meetings: Not on file    Marital Status: Not on file   Intimate Partner Violence:     Fear of Current or Ex-Partner: Not on file    Emotionally Abused: Not on file    Physically Abused: Not on file    Sexually Abused: Not on file   Housing Stability:     Unable to Pay for Housing in the Last Year: Not on file    Number of [place] in the Last Year: Not on file    Unstable Housing in the Last Year: Not on file           ROS:  [x] All negative/unchanged except if checked.  Explain positive(checked items) below:  [] Constitutional  [] Eyes  [] Ear/Nose/Mouth/Throat  [] Respiratory  [] CV  [] GI  []   [] Musculoskeletal  [] Skin/Breast  [] Neurological  [] Endocrine  [] Heme/Lymph  [] Allergic/Immunologic    Explanation:     MEDICATIONS:    Current Facility-Administered Medications:     nicotine polacrilex (COMMIT) lozenge 2 mg, 2 mg, Oral, Q2H PRN, Matt Castro MD, 2 mg at 01/08/22 0916    divalproex (DEPAKOTE) DR tablet 250 mg, 250 mg, Oral, 2 times per day, CHANNING Nguyen CNP, 250 mg at 01/08/22 0914    OLANZapine (ZYPREXA) tablet 5 mg, 5 mg, Oral, Nightly, CHANNING Nguyen - CNP, 5 mg at 01/07/22 2147    magnesium hydroxide (MILK OF MAGNESIA) 400 MG/5ML suspension 30 mL, 30 mL, Oral, Daily PRN, Matt Castro MD    aluminum & magnesium hydroxide-simethicone (MAALOX) 200-200-20 MG/5ML suspension 30 mL, 30 mL, Oral, PRN, Matt Castro MD    hydrOXYzine (VISTARIL) capsule 50 mg, 50 mg, Oral, TID PRN, Matt Castro MD, 50 mg at 01/07/22 0019    haloperidol (HALDOL) tablet 5 mg, 5 mg, Oral, Q6H PRN **OR** haloperidol lactate (HALDOL) injection 5 mg, 5 mg, IntraMUSCular, Q6H PRN, Matt Castro MD    traZODone (DESYREL) tablet 50 mg, 50 mg, Oral, Nightly PRN, Matt Castro MD, 50 mg at 01/07/22 0019    ibuprofen (ADVIL;MOTRIN) tablet 600 mg, 600 mg, Oral, Q6H PRN, Matt Castro MD, 600 mg at 01/07/22 1025      Examination:  /68   Pulse 60   Temp 97.8 °F (36.6 °C) (Temporal)   Resp 15   Ht 5' 8\" (1.727 m)   Wt 130 lb (59 kg)   LMP 12/23/2021   SpO2 99%   BMI 19.77 kg/m²   Gait - steady  Medication side effects(SE):     Mental Status Examination:    Level of consciousness:  within normal limits   Appearance:  good grooming and good hygiene  Behavior/Motor:  no abnormalities noted  Attitude toward examiner:  cooperative  Speech: Speech was coherent with normal rate, rhythm, and tone. Mood: \"I am anxious\"  Affect: Affect was appropriate with stated mood as she visibly appeared anxious by her facial expressions, how she spoke, and hand gestures.    Thought processes: coherent   Thought content: Thought content was devoid of AVH, delusions, or any other perceptual abnormalities  Cognition:  oriented to person, place, and time   Concentration intact  Insight good   Judgement good     ASSESSMENT:   Patient symptoms are:  [] Well controlled  [] Improving  [] Worsening  [x] No change      Diagnosis:Principal Problem:    Mixed bipolar II disorder ([facility] [address]  Resolved Problems:    Suicidal ideation      LABS:    Recent Labs     01/06/22  1527   WBC 9.0   HGB 14.9        Recent Labs     01/06/22  1527      K 4.0   *   CO2 20*   BUN 8   CREATININE 0.7   GLUCOSE 99     Recent Labs     01/06/22  1527   BILITOT 0.3   ALKPHOS 62   AST 10   ALT 9     Lab Results   Component Value Date    LABAMPH NOT DETECTED 01/06/2022    BARBSCNU NOT DETECTED 01/06/2022    LABBENZ NOT DETECTED 01/06/2022    LABMETH NOT DETECTED 01/06/2022    OPIATESCREENURINE NOT DETECTED 01/06/2022    PHENCYCLIDINESCREENURINE NOT DETECTED 01/06/2022    ETOH <10 01/06/2022     No results found for: TSH, FREET4  No results found for: LITHIUM  No results found for: VALPROATE, CBMZ        Treatment Plan:  Reviewed current Medications with the patient. Risks, benefits, side effects, drug-to-drug interactions and alternatives to treatment were discussed. Collateral information:   CD evaluation  Encourage patient to attend group and other milieu activities.   Discharge planning discussed with the patient and treatment team.    Depakote 250 mg twice daily  Zyprexa 5 mg at bedtime    PSYCHOTHERAPY/COUNSELING:  [x] Therapeutic interview  [x] Supportive  [] CBT  [] Ongoing  [] Other    [x] Patient continues to need, on a daily basis, active treatment furnished directly by or requiring the supervision of inpatient psychiatric personnel      Anticipated Length of stay: 3-5 days             Electronically signed by James Snowden on 1/8/2022 at 10:34 AM

## 2022-01-08 NOTE — GROUP NOTE
Group Therapy Note    Date: 1/8/2022    Group Start Time: 1115  Group End Time: 3136  Group Topic: Cognitive Skills    [facility]    GIAN Mckeon LSW        Group Therapy Note    Attendees: 12         Patient's Goal:  Pt will be able to verbalize understanding of setting boundaries and why it is important to set them. Notes:  Pt made connections and participated in group. Status After Intervention:  Improved    Participation Level:  Active Listener and Interactive    Participation Quality: Appropriate, Attentive, Sharing and Supportive      Speech:  normal      Thought Process/Content: Logical  Linear      Affective Functioning: Congruent      Mood: depressed      Level of consciousness:  Alert, Oriented x4 and Attentive      Response to Learning: Able to verbalize current knowledge/experience      Endings: None Reported    Modes of Intervention: Education, Support, Socialization, Exploration, Clarifying, Problem-solving and Activity      Discipline Responsible: /Counselor      Signature:  GIAN Mckeon LSW

## 2022-01-08 NOTE — PLAN OF CARE
Sitting out in the main lounge playing table games with peers. Social and interacting appropriately. Denies suicidal thoughts or intent to harm herself or others. No voiced delusions. Denies hallucinations. Taking po foods and fluids well.

## 2022-01-08 NOTE — GROUP NOTE
Group Therapy Note    Date: 1/8/2022    Group Start Time: 1000  Group End Time: 1100  Group Topic: Psychoeducation    [facility] [address]        Group Therapy Note    Attendees: 12         Notes: Active and engaged during discussion on mindfulness. Able to practice guided imagery activity. Pleasant in sharing experiences with peers. Status After Intervention:  Improved    Participation Level:  Active Listener and Interactive    Participation Quality: Appropriate, Attentive and Sharing      Speech:  normal      Thought Process/Content: Logical      Affective Functioning: Congruent      Mood: euthymic      Level of consciousness:  Alert and Attentive      Response to Learning: Progressing to goal      Endings: None Reported    Modes of Intervention: Education, Support, Socialization and Exploration      Discipline Responsible: Psychoeducational Specialist      Signature:  Delicia Valdez, [address]

## 2022-01-08 NOTE — PLAN OF CARE
Problem: Altered Mood, Depressive Behavior:  Goal: Able to verbalize and/or display a decrease in depressive symptoms  Description: Able to verbalize and/or display a decrease in depressive symptoms  1/7/2022 2100 by Nelson Matias RN  Outcome: Ongoing  1/7/2022 0847 by Odessa Larson RN  Outcome: Ongoing  Goal: Absence of self-harm  Description: Absence of self-harm  1/7/2022 2100 by Nelson Matias RN  Outcome: Met This Shift  1/7/2022 0847 by Odessa Larson RN  Outcome: Met This Shift     Pt denies suicidal ideations, homicidal ideations and hallucinations. Pt stated she is feeling much better today. Pt stated she is just now adjusting to the unit and how things are setup and done. Pt seen coloring on the unit with peers. Pt is calm and cooperative. Presents anxious. Flat. Medication compliant. Attending groups. Will continue to monitor.

## 2022-01-09 PROCEDURE — 1240000000 HC EMOTIONAL WELLNESS R&B

## 2022-01-09 PROCEDURE — 6370000000 HC RX 637 (ALT 250 FOR IP): Performed by: PSYCHIATRY & NEUROLOGY

## 2022-01-09 PROCEDURE — 99232 SBSQ HOSP IP/OBS MODERATE 35: CPT | Performed by: NURSE PRACTITIONER

## 2022-01-09 PROCEDURE — 6370000000 HC RX 637 (ALT 250 FOR IP): Performed by: NURSE PRACTITIONER

## 2022-01-09 RX ADMIN — DIVALPROEX SODIUM 250 MG: 250 TABLET, DELAYED RELEASE ORAL at 21:05

## 2022-01-09 RX ADMIN — NICOTINE POLACRILEX 2 MG: 2 LOZENGE ORAL at 16:02

## 2022-01-09 RX ADMIN — NICOTINE POLACRILEX 2 MG: 2 LOZENGE ORAL at 13:37

## 2022-01-09 RX ADMIN — NICOTINE POLACRILEX 2 MG: 2 LOZENGE ORAL at 09:11

## 2022-01-09 RX ADMIN — NICOTINE POLACRILEX 2 MG: 2 LOZENGE ORAL at 19:55

## 2022-01-09 RX ADMIN — OLANZAPINE 5 MG: 5 TABLET, FILM COATED ORAL at 21:05

## 2022-01-09 RX ADMIN — DIVALPROEX SODIUM 250 MG: 250 TABLET, DELAYED RELEASE ORAL at 09:04

## 2022-01-09 RX ADMIN — TRAZODONE HYDROCHLORIDE 50 MG: 50 TABLET ORAL at 21:05

## 2022-01-09 RX ADMIN — NICOTINE POLACRILEX 2 MG: 2 LOZENGE ORAL at 21:05

## 2022-01-09 ASSESSMENT — PAIN SCALES - GENERAL
PAINLEVEL_OUTOF10: 0

## 2022-01-09 NOTE — GROUP NOTE
Group Therapy Note    Date: 1/9/2022    Group Start Time: 1000  Group End Time: 1100  Group Topic: Psychoeducation    [facility] [address]        Group Therapy Note    Attendees: 14         Notes: Active and engaged during discussion on community resources and discharge planning. Status After Intervention:  Improved    Participation Level:  Active Listener and Interactive    Participation Quality: Appropriate and Attentive      Speech:  normal      Thought Process/Content: Logical      Affective Functioning: Congruent      Mood: euthymic      Level of consciousness:  Alert and Attentive      Response to Learning: Progressing to goal      Endings: None Reported    Modes of Intervention: Education, Support, Socialization and Exploration      Discipline Responsible: Psychoeducational Specialist      Signature:  Abiel Martinez, [address]

## 2022-01-09 NOTE — PROGRESS NOTES
.. BEHAVIORAL HEALTH FOLLOW-UP NOTE     1/9/2022     Patient was seen and examined in person, Chart reviewed   Patient's case discussed with staff/team    Chief Complaint: \" I am feeling a lot better\"    Interim History:   Patient is on the unit bright pleasant affect she socializing with peers attending groups. She vehemently denies SI/HI intent or plan denies any auditory visual hallucinations. Believes these medications are working well for her. States she is been talking to her  they are on good terms. She is looking forward to discharge. Eating well sleeping well no neurovegetative signs of depression        Appetite:  [x] Normal/Unchanged  [] Increased  [] Decreased      Sleep:       [x] Normal/Unchanged  [] Fair       [] Poor              Energy:    [x] Normal/Unchanged  [] Increased  [] Decreased        SI [] Present  [x] Absent    HI  []Present  [x] Absent     Aggression:  [] yes  [x] no    Patient is [x] able  [] unable to CONTRACT FOR SAFETY     PAST MEDICAL/PSYCHIATRIC HISTORY:   History reviewed. No pertinent past medical history. FAMILY/SOCIAL HISTORY:  History reviewed. No pertinent family history. Social History     Socioeconomic History    Marital status: Single     Spouse name: Not on file    Number of children: Not on file    Years of education: Not on file    Highest education level: Not on file   Occupational History    Not on file   Tobacco Use    Smoking status: Never Smoker    Smokeless tobacco: Never Used   Substance and Sexual Activity    Alcohol use:  Yes    Drug use: Never    Sexual activity: Not on file   Other Topics Concern    Not on file   Social History Narrative    Not on file     Social Determinants of Health     Financial Resource Strain:     Difficulty of Paying Living Expenses: Not on file   Food Insecurity:     Worried About Running Out of Food in the Last Year: Not on file    Dayanara of Food in the Last Year: Not on file   Transportation Needs:  Lack of Transportation (Medical): Not on file    Lack of Transportation (Non-Medical): Not on file   Physical Activity:     Days of Exercise per Week: Not on file    Minutes of Exercise per Session: Not on file   Stress:     Feeling of Stress : Not on file   Social Connections:     Frequency of Communication with Friends and Family: Not on file    Frequency of Social Gatherings with Friends and Family: Not on file    Attends Yazidism Services: Not on file    Active Member of [address] [place] [address] or Organizations: Not on file    Attends Club or Organization Meetings: Not on file    Marital Status: Not on file   Intimate Partner Violence:     Fear of Current or Ex-Partner: Not on file    Emotionally Abused: Not on file    Physically Abused: Not on file    Sexually Abused: Not on file   Housing Stability:     Unable to Pay for Housing in the Last Year: Not on file    Number of [place] in the Last Year: Not on file    Unstable Housing in the Last Year: Not on file           ROS:  [x] All negative/unchanged except if checked.  Explain positive(checked items) below:  [] Constitutional  [] Eyes  [] Ear/Nose/Mouth/Throat  [] Respiratory  [] CV  [] GI  []   [] Musculoskeletal  [] Skin/Breast  [] Neurological  [] Endocrine  [] Heme/Lymph  [] Allergic/Immunologic    Explanation:     MEDICATIONS:    Current Facility-Administered Medications:     nicotine polacrilex (COMMIT) lozenge 2 mg, 2 mg, Oral, Q2H PRN, Suyapa Lowe MD, 2 mg at 01/08/22 1829    divalproex (DEPAKOTE) DR tablet 250 mg, 250 mg, Oral, 2 times per day, CHANNING Gallardo CNP, 250 mg at 01/08/22 2100    OLANZapine (ZYPREXA) tablet 5 mg, 5 mg, Oral, Nightly, CHANNING Gallardo CNP, 5 mg at 01/08/22 2100    magnesium hydroxide (MILK OF MAGNESIA) 400 MG/5ML suspension 30 mL, 30 mL, Oral, Daily PRN, Suyapa Lowe MD    aluminum & magnesium hydroxide-simethicone (MAALOX) 200-200-20 MG/5ML suspension 30 mL, 30 mL, Oral, PRN, Kareen Gold Shazia Walters MD    hydrOXYzine (VISTARIL) capsule 50 mg, 50 mg, Oral, TID PRN, Winsome Sanches MD, 50 mg at 01/07/22 0019    haloperidol (HALDOL) tablet 5 mg, 5 mg, Oral, Q6H PRN **OR** haloperidol lactate (HALDOL) injection 5 mg, 5 mg, IntraMUSCular, Q6H PRN, Winsome Sanches MD    traZODone (DESYREL) tablet 50 mg, 50 mg, Oral, Nightly PRN, Winsome Sanches MD, 50 mg at 01/08/22 2100    ibuprofen (ADVIL;MOTRIN) tablet 600 mg, 600 mg, Oral, Q6H PRN, Winsome Sanches MD, 600 mg at 01/07/22 1025      Examination:  BP (!) 109/55   Pulse 81   Temp 97.5 °F (36.4 °C) (Temporal)   Resp 14   Ht 5' 8\" (1.727 m)   Wt 130 lb (59 kg)   LMP 12/23/2021   SpO2 99%   BMI 19.77 kg/m²   Gait - steady  Medication side effects(SE):     Mental Status Examination:    Level of consciousness:  within normal limits   Appearance:  good grooming and good hygiene  Behavior/Motor:  no abnormalities noted  Attitude toward examiner:  cooperative  Speech: Speech was coherent with normal rate, rhythm, and tone. Mood: \"I am anxious\"  Affect: Affect was appropriate with stated mood as she visibly appeared anxious by her facial expressions, how she spoke, and hand gestures. Thought processes:  coherent   Thought content:  Thought content was devoid of AVH, delusions, or any other perceptual abnormalities  Cognition:  oriented to person, place, and time   Concentration intact  Insight good   Judgement good     ASSESSMENT:   Patient symptoms are:  [] Well controlled  [] Improving  [] Worsening  [x] No change      Diagnosis:Principal Problem:    [facility]  Resolved Problems:    Suicidal ideation      LABS:    Recent Labs     01/06/22  1527   WBC 9.0   HGB 14.9        Recent Labs     01/06/22  1527      K 4.0   *   CO2 20*   BUN 8   CREATININE 0.7   GLUCOSE 99     Recent Labs     01/06/22  1527   BILITOT 0.3   ALKPHOS 62   AST 10   ALT 9     Lab Results   Component Value Date    LABAMPH NOT DETECTED 01/06/2022    BARBSCNU NOT DETECTED 01/06/2022    LABBENZ NOT DETECTED 01/06/2022    LABMETH NOT DETECTED 01/06/2022    OPIATESCREENURINE NOT DETECTED 01/06/2022    PHENCYCLIDINESCREENURINE NOT DETECTED 01/06/2022    ETOH <10 01/06/2022     No results found for: TSH, FREET4  No results found for: LITHIUM  No results found for: VALPROATE, CBMZ        Treatment Plan:  Reviewed current Medications with the patient. Risks, benefits, side effects, drug-to-drug interactions and alternatives to treatment were discussed. Collateral information:   CD evaluation  Encourage patient to attend group and other milieu activities.   Discharge planning discussed with the patient and treatment team.  Continue Depakote 250 mg twice daily  Continue Zyprexa 5 mg at bedtime      PSYCHOTHERAPY/COUNSELING:  [x] Therapeutic interview  [x] Supportive  [] CBT  [] Ongoing  [] Other    [x] Patient continues to need, on a daily basis, active treatment furnished directly by or requiring the supervision of inpatient psychiatric personnel      Anticipated Length of stay: 3-5 days             Electronically signed by CHANNING Wilson CNP on 2/7/6724 at 8:35 AM

## 2022-01-09 NOTE — PLAN OF CARE
Caroline miranda excellent particip in this [facility] group on nutn and mental health. Excellent comment and contributions. Scored 100 on written project.

## 2022-01-09 NOTE — PLAN OF CARE
Pt is stable and alert. Pt denies suicidal or homicidal ideations. Pt denies hallucinations. Pt is brightened and communicative. No other distress. Pt reported goal, \"to talk with \" pr pt. Will follow and monitor.

## 2022-01-09 NOTE — CARE COORDINATION
Sw met with pt for check in. Sw observed pt socializing in common room and doing a puzzle. Pt bright, smiling. Pt states that she is excited to go home. Pt denies SI/HI/AVH. Pt will need a work excuse at time of dc. Sw will continue to assist as needed.

## 2022-01-09 NOTE — PLAN OF CARE
Out annd social with peers. Playing table games and doing puzzles. Affect is bright with congruent mood. Denies suicidal ideations or intent to harm herself or others. No voiced delusions. Denies hallucinations. Taking po foods and fluids well.

## 2022-01-09 NOTE — GROUP NOTE
Group Therapy Note    Date: 1/9/2022    Group Start Time: 1125  Group End Time: 0455  Group Topic: Cognitive Skills    [facility]    GIAN Kirkland LSW        Group Therapy Note    Attendees: 14         Patient's Goal:  Pt will be able to verbalize understanding of the importance of self-care. Pt will also adequately fill out a self-care assessment. Notes:  Pt made connections and participated in group. Status After Intervention:  Improved    Participation Level:  Active Listener and Interactive    Participation Quality: Appropriate, Attentive, Sharing and Supportive      Speech:  normal      Thought Process/Content: Logical  Linear      Affective Functioning: Congruent      Mood: depressed      Level of consciousness:  Alert, Oriented x4 and Attentive      Response to Learning: Able to verbalize current knowledge/experience      Endings: None Reported    Modes of Intervention: Education, Support, Socialization, Exploration, Clarifying, Problem-solving and Activity      Discipline Responsible: /Counselor      Signature:  GIAN Kirkland LSW

## 2022-01-10 VITALS
TEMPERATURE: 97 F | HEIGHT: 68 IN | OXYGEN SATURATION: 99 % | DIASTOLIC BLOOD PRESSURE: 76 MMHG | HEART RATE: 76 BPM | SYSTOLIC BLOOD PRESSURE: 118 MMHG | WEIGHT: 130 LBS | BODY MASS INDEX: 19.7 KG/M2 | RESPIRATION RATE: 14 BRPM

## 2022-01-10 LAB — VALPROIC ACID LEVEL: 35 MCG/ML (ref 50–100)

## 2022-01-10 PROCEDURE — 80164 ASSAY DIPROPYLACETIC ACD TOT: CPT

## 2022-01-10 PROCEDURE — 6370000000 HC RX 637 (ALT 250 FOR IP): Performed by: PSYCHIATRY & NEUROLOGY

## 2022-01-10 PROCEDURE — 99239 HOSP IP/OBS DSCHRG MGMT >30: CPT | Performed by: NURSE PRACTITIONER

## 2022-01-10 PROCEDURE — 6370000000 HC RX 637 (ALT 250 FOR IP): Performed by: NURSE PRACTITIONER

## 2022-01-10 PROCEDURE — 36415 COLL VENOUS BLD VENIPUNCTURE: CPT

## 2022-01-10 RX ORDER — DIVALPROEX SODIUM 250 MG/1
250 TABLET, DELAYED RELEASE ORAL EVERY 12 HOURS SCHEDULED
Qty: 60 TABLET | Refills: 0 | Status: SHIPPED | OUTPATIENT
Start: 2022-01-10 | End: 2022-08-19

## 2022-01-10 RX ORDER — POLYETHYLENE GLYCOL 3350 17 G
2 POWDER IN PACKET (EA) ORAL
Qty: 100 EACH | Refills: 3
Start: 2022-01-10 | End: 2022-08-19

## 2022-01-10 RX ORDER — OLANZAPINE 5 MG/1
5 TABLET ORAL NIGHTLY
Qty: 30 TABLET | Refills: 0 | Status: SHIPPED | OUTPATIENT
Start: 2022-01-10 | End: 2022-08-19

## 2022-01-10 RX ORDER — DIVALPROEX SODIUM 250 MG/1
250 TABLET, DELAYED RELEASE ORAL EVERY 12 HOURS SCHEDULED
Qty: 60 TABLET | Refills: 0 | Status: SHIPPED | OUTPATIENT
Start: 2022-01-10 | End: 2022-01-10

## 2022-01-10 RX ADMIN — NICOTINE POLACRILEX 2 MG: 2 LOZENGE ORAL at 14:42

## 2022-01-10 RX ADMIN — IBUPROFEN 600 MG: 600 TABLET, FILM COATED ORAL at 10:50

## 2022-01-10 RX ADMIN — DIVALPROEX SODIUM 250 MG: 250 TABLET, DELAYED RELEASE ORAL at 09:12

## 2022-01-10 RX ADMIN — NICOTINE POLACRILEX 2 MG: 2 LOZENGE ORAL at 10:50

## 2022-01-10 ASSESSMENT — PAIN SCALES - GENERAL
PAINLEVEL_OUTOF10: 3
PAINLEVEL_OUTOF10: 0

## 2022-01-10 NOTE — CARE COORDINATION
SW spoke with patient regarding discharge plan. Pt was alert/oriented, had good eye contact, normal speech, logical thought process. She denied SI/HI/hallucinations. Pt reported that she is looking forward to returning home with her  today. Appointments are scheduled with Richard Sanches.      Evelyne Cogan, MSMARLENE, FAITH

## 2022-01-10 NOTE — GROUP NOTE
Group Therapy Note    Date: 1/10/2022    Group Start Time: 1000  Group End Time: 1958  Group Topic: Psychoeducation    [facility]    Lizzette Davenport, CTRS        Group Therapy Note    Number of participants: 17  Type of group: Psychoeducation  Mode of intervention: Education, Support, Socialization, Exploration, Clarifying, Problem-solving, and Activity  Topic: Time Management Skills  Objective: Pt will identify 3 tips to help with time management in recovery. Notes:  Pt interactive during group sharing 3 tips to help with time management in recovery. Shared that time management is an area she struggles with but was able to find things that would help her. Also shared that being less than perfect when completing task is something she wants to work on. Accepting of support from peers. Status After Intervention:  Improved    Participation Level:  Active Listener and Interactive    Participation Quality: Appropriate, Attentive, Sharing and Supportive      Speech:  normal      Thought Process/Content: Logical      Affective Functioning: Congruent      Mood: euthymic      Level of consciousness:  Alert, Oriented x4 and Attentive      Response to Learning: Able to verbalize current knowledge/experience, Able to verbalize/acknowledge new learning, Able to retain information, Capable of insight, Able to change behavior and Progressing to goal      Endings: None Reported    Modes of Intervention: Education, Support, Socialization, Exploration, Clarifying, Problem-solving and Activity

## 2022-01-10 NOTE — BH NOTE
[facility]  Discharge Note    Pt discharged with followings belongings:   Dental Appliances: None  Vision - Corrective Lenses: Glasses  Hearing Aid: None  Jewelry: None  Body Piercings Removed: N/A  Clothing: Undergarments (Comment) (shorts w/strings)  Were All Patient Medications Collected?: Not Applicable  Other Valuables: Cell phone,Wallet   Valuables returned to patient. Patient education on aftercare instructions: yes 4:34 PM .Patient verbalize understanding of AVS:  yes.     Status EXAM upon discharge:  Status and Exam  Normal: Yes (Resting in bed apparently asleep)  Facial Expression: Brightened  Affect: Congruent  Level of Consciousness: Alert  Mood:Normal: Yes  Mood: Anxious  Motor Activity:Normal: Yes  Interview Behavior: Cooperative  Preception: [place] to Person,[place] to Time,[place] to Place,[place] to Situation  Attention:Normal: Yes  Thought Processes: Circumstantial  Thought Content:Normal: Yes  Hallucinations: None  Delusions: No  Memory:Normal: Yes  Insight and Judgment: No  Insight and Judgment: Poor Judgment,Poor Insight  Present Suicidal Ideation: No  Present Homicidal Ideation: No      Metabolic Screening:    No results found for: LABA1C    No results found for: CHOL  No results found for: TRIG  No results found for: HDL  No components found for: LDLCAL  No results found for: Miriam Machado RN

## 2022-01-10 NOTE — GROUP NOTE
Group Therapy Note    Date: 1/10/2022    Group Start Time: 1100  Group End Time: 1150  Group Topic: Cognitive Skills    [facility]  Av. GIAN Chavarria, LSW        Group Therapy Note    Attendees: 16         Patient's Goal:  Pt will be able to identify ways to manage their stress and identify when they are stressed. Notes:  Pt participated in group and made connections. Status After Intervention:  Improved    Participation Level:  Active Listener and Interactive    Participation Quality: Appropriate, Attentive and Sharing      Speech:  normal      Thought Process/Content: Logical  Linear      Affective Functioning: Congruent      Mood: euthymic      Level of consciousness:  Alert, Oriented x4 and Attentive      Response to Learning: Able to verbalize current knowledge/experience, Able to verbalize/acknowledge new learning, Able to retain information and Capable of insight      Endings: None Reported    Modes of Intervention: Education, Support, Socialization, Exploration, Clarifying and Problem-solving      Discipline Responsible: /Counselor      Signature:  GIAN Lopez, [facility]

## 2022-01-10 NOTE — PROGRESS NOTES
CLINICAL PHARMACY NOTE: MEDS TO BEDS    Total # of Prescriptions Filled: 2   The following medications were delivered to the patient:  · Olanzapine 5mg  · depakote dr 250mg    Additional Documentation:    Delivered to [facility]  LIBERTAS RN 1/10/22 @4:15pm

## 2022-01-10 NOTE — PROGRESS NOTES
Patient attended morning community meeting. Updated on staffing and daily expectations.    Shared goal for the day as to do 15 minutes of the elliptical.

## 2022-01-10 NOTE — DISCHARGE SUMMARY
DISCHARGE SUMMARY      Patient ID:  Martha Hester  [id number]  88 y.o.  1995    Admit date: 1/6/2022    Discharge date and time: 1/10/2022    Admitting Physician: José Manuel Wesley MD     Discharge Physician: Dr Taryn Will MD    Discharge Diagnoses:   Patient Active Problem List   Diagnosis    Mixed bipolar II disorder [facility])       Admission Condition: poor    Discharged Condition: stable    Admission Circumstance: Patient is a 27year old female who presented to [address] [place] [address] Emergency Department by EMS for suicidal ideation and neck/low back pain. She denies plan and suicidal ideation. She has had worsening depression and suicidal thoughts. PAST MEDICAL/PSYCHIATRIC HISTORY:   History reviewed. No pertinent past medical history. FAMILY/SOCIAL HISTORY:  History reviewed. No pertinent family history. Social History     Socioeconomic History    Marital status: Single     Spouse name: Not on file    Number of children: Not on file    Years of education: Not on file    Highest education level: Not on file   Occupational History    Not on file   Tobacco Use    Smoking status: Never Smoker    Smokeless tobacco: Never Used   Substance and Sexual Activity    Alcohol use: Yes    Drug use: Never    Sexual activity: Not on file   Other Topics Concern    Not on file   Social History Narrative    Not on file     Social Determinants of Health     Financial Resource Strain:     Difficulty of Paying Living Expenses: Not on file   Food Insecurity:     Worried About Running Out of Food in the Last Year: Not on file    Dyaanara of Food in the Last Year: Not on file   Transportation Needs:     Lack of Transportation (Medical): Not on file    Lack of Transportation (Non-Medical):  Not on file   Physical Activity:     Days of Exercise per Week: Not on file    Minutes of Exercise per Session: Not on file   Stress:     Feeling of Stress : Not on file   Social Connections:     Frequency of Communication with Friends and Family: Not on file    Frequency of Social Gatherings with Friends and Family: Not on file    Attends Gnosticist Services: Not on file    Active Member of [address] [place] [address] or Organizations: Not on file    Attends Club or Organization Meetings: Not on file    Marital Status: Not on file   Intimate Partner Violence:     Fear of Current or Ex-Partner: Not on file    Emotionally Abused: Not on file    Physically Abused: Not on file    Sexually Abused: Not on file   Housing Stability:     Unable to Pay for Housing in the Last Year: Not on file    Number of [place] in the Last Year: Not on file    Unstable Housing in the Last Year: Not on file       MEDICATIONS:    Current Facility-Administered Medications:     nicotine polacrilex (COMMIT) lozenge 2 mg, 2 mg, Oral, Q2H PRN, Nikolai Sanchez MD, 2 mg at 01/09/22 2105    divalproex (DEPAKOTE) DR tablet 250 mg, 250 mg, Oral, 2 times per day, Marilee Kehr, APRN - CNP, 250 mg at 01/09/22 2105    OLANZapine (ZYPREXA) tablet 5 mg, 5 mg, Oral, Nightly, Marilee Kehr, APRN - CNP, 5 mg at 01/09/22 2105    magnesium hydroxide (MILK OF MAGNESIA) 400 MG/5ML suspension 30 mL, 30 mL, Oral, Daily PRN, Nikolai Sanchez MD    aluminum & magnesium hydroxide-simethicone (MAALOX) 200-200-20 MG/5ML suspension 30 mL, 30 mL, Oral, PRN, Nikolai Sanchez MD    hydrOXYzine (VISTARIL) capsule 50 mg, 50 mg, Oral, TID PRN, Nikolai Sanchez MD, 50 mg at 01/07/22 0019    haloperidol (HALDOL) tablet 5 mg, 5 mg, Oral, Q6H PRN **OR** haloperidol lactate (HALDOL) injection 5 mg, 5 mg, IntraMUSCular, Q6H PRN, Nikolai Sanchez MD    traZODone (DESYREL) tablet 50 mg, 50 mg, Oral, Nightly PRN, Nikolai Sanchez MD, 50 mg at 01/09/22 2105    ibuprofen (ADVIL;MOTRIN) tablet 600 mg, 600 mg, Oral, Q6H PRN, Nikolai Sanchez MD, 600 mg at 01/07/22 1025    Examination:  /76   Pulse 76   Temp 97 °F (36.1 °C) (Temporal)   Resp 14   Ht 5' 8\" (1.727 m)   Wt 130 lb (59 kg)   LMP 12/23/2021   SpO2 99%   BMI 19.77 kg/m²   Gait - steady    HOSPITAL COURSE[de-identified]  Patient was admitted to the unit on 1/6/2022 was closely monitored for suicidal ideations. She was evaluated and treated with Depakote and 50 mg twice daily and Zyprexa 5 mg at bedtime. Medical events were insignificant and patient continued to improve on the floor. She started coming out of her room she was attending groups to socializing with peers. She never made any suicidal statements or any suicidal gestures while in the unit. Social workers obtain confirmation patient's  who was able to voice any concerns that he had. He reported no safety concerns no access to any guns. Treatment team felt the patient obtain the maximum benefit for her hospitalization She was set up with an outpatient mental health agency for outpatient follow-up services . At the time of discharge patient  did not show impulsive behavior. She was up on the unit bright pleasant affect she was attending groups and socializing with peers. She vehemently denied any suicidal homicidal ideations intent or plan. She was eating well and sleeping well there are no neurovegetative signs or symptoms of depression she denied any auditory visualizations. There are no overt or covert signs psychosis. She was appreciative of the help that she received here. This patient no longer meets criteria for inpatient hospitalization. No AVH or paranoid thoughts  No Hopeless or worthless feeling  No active SI/HI  Appetite:  [x] Normal  [] Increased  [] Decreased    Sleep:       [x] Normal  [] Fair       [] Poor            Energy:    [x] Normal  [] Increased  [] Decreased     SI [] Present  [x] Absent  HI  []Present  [x] Absent   Aggression:  [] yes  [x] no  Patient is [x] able  [] unable to CONTRACT FOR SAFETY   Medication side effects(SE):  [x] None(Psych.  Meds.) [] Other      Mental Status Examination on discharge:    Level of consciousness:  within normal limits   Appearance:  well-appearing  Behavior/Motor:  no abnormalities noted  Attitude toward examiner:  attentive and good eye contact  Speech:  spontaneous, normal rate and normal volume   Mood: \"My mood is good. \"  Affect: Appropriate pleasant  Thought processes: Linear without flight of ideas loose associations  Thought content: Devoid of any auditory visualizations delusions or other perceptual rise. Denies SI/HI intent or plan  Cognition:  oriented to person, place, and time   Concentration intact  Memory intact  Insight good   Judgement fair   Fund of Knowledge adequate      ASSESSMENT:  Patient symptoms are:  [x] Well controlled  [x] Improving  [] Worsening  [] No change    Reason for more than one antipsychotic:  [x] N/A  [] 3 Failed Monotherapy attempts (Drugs tried:)  [] Crossover to a new antipsychotic  [] Taper to Monotherapy from Polypharmacy  [] Augmentation of clozapine therapy due to treatment resistance to single therapy    Diagnosis:  Principal Problem:    [facility]  Resolved Problems:    Suicidal ideation      LABS:    No results for input(s): WBC, HGB, PLT in the last 72 hours. No results for input(s): NA, K, CL, CO2, BUN, CREATININE, GLUCOSE in the last 72 hours. No results for input(s): BILITOT, ALKPHOS, AST, ALT in the last 72 hours. Lab Results   Component Value Date    LABAMPH NOT DETECTED 01/06/2022    BARBSCNU NOT DETECTED 01/06/2022    LABBENZ NOT DETECTED 01/06/2022    LABMETH NOT DETECTED 01/06/2022    OPIATESCREENURINE NOT DETECTED 01/06/2022    PHENCYCLIDINESCREENURINE NOT DETECTED 01/06/2022    ETOH <10 01/06/2022     No results found for: TSH, FREET4  No results found for: LITHIUM  No results found for: VALPROATE, CBMZ    RISK ASSESSMENT AT DISCHARGE: Low risk for suicide and homicide. Treatment Plan:  Reviewed current Medications with the patient. Education provided on the complaince with treatment.     Risks, benefits, side effects, drug-to-drug interactions and alternatives to treatment were discussed. Encourage patient to attend outpatient follow up appointment and therapy. Patient was advised to call the outpatient provider, visit the nearest ED or call 911 if symptoms are not manageable. Patient's family member was contacted prior to the discharge. Medication List      You have not been prescribed any medications.        Patient is counseled if she continues to abuse drugs or alcohol she could act out impulsively causing serious harm to herself or others even though it may be unintentional.  She demonstrated understanding of this and has the capacity understand this    Patient is counseled her mental health treatment will be difficult to optimize with ongoing use of drugs or alcohol she demonstrated understanding of this and has the capacity to understand this     Patient is counseled she must remain compliant with all medications outpatient follow-up appointments    Patient is discharged home in stable condition      TIME SPEND - 35 MINUTES TO COMPLETE THE EVALUATION, DISCHARGE SUMMARY, MEDICATION RECONCILIATION AND FOLLOW UP CARE     Signed:  CHANNING Andrade CNP  3/19/1873  8:54 AM

## 2022-01-10 NOTE — CARE COORDINATION
In order to ensure appropriate transition and discharge planning is in place, the following documents have been transmitted to [facility], as the new outpatient provider:     The d/c diagnosis was transmitted to the next care provider   The reason for hospitalization was transmitted to the next care provider   The d/c medications (dosage and indication) were transmitted to the next care provider    The continuing care plan was transmitted to the next care provider

## 2022-08-19 ENCOUNTER — OFFICE VISIT (OUTPATIENT)
Dept: FAMILY MEDICINE CLINIC | Age: 27
End: 2022-08-19
Payer: COMMERCIAL

## 2022-08-19 VITALS
HEART RATE: 85 BPM | SYSTOLIC BLOOD PRESSURE: 101 MMHG | BODY MASS INDEX: 24.31 KG/M2 | WEIGHT: 160.44 LBS | OXYGEN SATURATION: 97 % | HEIGHT: 68 IN | TEMPERATURE: 98.2 F | DIASTOLIC BLOOD PRESSURE: 70 MMHG

## 2022-08-19 DIAGNOSIS — Z80.3 FAMILY HISTORY OF BREAST CANCER IN MOTHER: ICD-10-CM

## 2022-08-19 DIAGNOSIS — F31.81 MIXED BIPOLAR II DISORDER (HCC): Primary | ICD-10-CM

## 2022-08-19 PROCEDURE — 99213 OFFICE O/P EST LOW 20 MIN: CPT | Performed by: FAMILY MEDICINE

## 2022-08-19 PROCEDURE — 4004F PT TOBACCO SCREEN RCVD TLK: CPT | Performed by: FAMILY MEDICINE

## 2022-08-19 PROCEDURE — G8427 DOCREV CUR MEDS BY ELIG CLIN: HCPCS | Performed by: FAMILY MEDICINE

## 2022-08-19 PROCEDURE — 99212 OFFICE O/P EST SF 10 MIN: CPT | Performed by: FAMILY MEDICINE

## 2022-08-19 PROCEDURE — G8420 CALC BMI NORM PARAMETERS: HCPCS | Performed by: FAMILY MEDICINE

## 2022-08-19 RX ORDER — DIPHENHYDRAMINE HYDROCHLORIDE 25 MG/1
25 CAPSULE ORAL DAILY
COMMUNITY
Start: 2022-07-05

## 2022-08-19 SDOH — ECONOMIC STABILITY: FOOD INSECURITY: WITHIN THE PAST 12 MONTHS, THE FOOD YOU BOUGHT JUST DIDN'T LAST AND YOU DIDN'T HAVE MONEY TO GET MORE.: NEVER TRUE

## 2022-08-19 SDOH — ECONOMIC STABILITY: FOOD INSECURITY: WITHIN THE PAST 12 MONTHS, YOU WORRIED THAT YOUR FOOD WOULD RUN OUT BEFORE YOU GOT MONEY TO BUY MORE.: NEVER TRUE

## 2022-08-19 ASSESSMENT — ENCOUNTER SYMPTOMS
COLOR CHANGE: 0
VOMITING: 0
COUGH: 0
WHEEZING: 0
BACK PAIN: 0
DIARRHEA: 0
SHORTNESS OF BREATH: 0
ABDOMINAL PAIN: 0

## 2022-08-19 ASSESSMENT — PATIENT HEALTH QUESTIONNAIRE - PHQ9
2. FEELING DOWN, DEPRESSED OR HOPELESS: 1
3. TROUBLE FALLING OR STAYING ASLEEP: 0
9. THOUGHTS THAT YOU WOULD BE BETTER OFF DEAD, OR OF HURTING YOURSELF: 0
1. LITTLE INTEREST OR PLEASURE IN DOING THINGS: 1
SUM OF ALL RESPONSES TO PHQ9 QUESTIONS 1 & 2: 2
8. MOVING OR SPEAKING SO SLOWLY THAT OTHER PEOPLE COULD HAVE NOTICED. OR THE OPPOSITE, BEING SO FIGETY OR RESTLESS THAT YOU HAVE BEEN MOVING AROUND A LOT MORE THAN USUAL: 0
SUM OF ALL RESPONSES TO PHQ QUESTIONS 1-9: 6
SUM OF ALL RESPONSES TO PHQ QUESTIONS 1-9: 6
4. FEELING TIRED OR HAVING LITTLE ENERGY: 1
10. IF YOU CHECKED OFF ANY PROBLEMS, HOW DIFFICULT HAVE THESE PROBLEMS MADE IT FOR YOU TO DO YOUR WORK, TAKE CARE OF THINGS AT HOME, OR GET ALONG WITH OTHER PEOPLE: 1
SUM OF ALL RESPONSES TO PHQ QUESTIONS 1-9: 6
6. FEELING BAD ABOUT YOURSELF - OR THAT YOU ARE A FAILURE OR HAVE LET YOURSELF OR YOUR FAMILY DOWN: 0
SUM OF ALL RESPONSES TO PHQ QUESTIONS 1-9: 6
7. TROUBLE CONCENTRATING ON THINGS, SUCH AS READING THE NEWSPAPER OR WATCHING TELEVISION: 0
5. POOR APPETITE OR OVEREATING: 3

## 2022-08-19 ASSESSMENT — SOCIAL DETERMINANTS OF HEALTH (SDOH): HOW HARD IS IT FOR YOU TO PAY FOR THE VERY BASICS LIKE FOOD, HOUSING, MEDICAL CARE, AND HEATING?: NOT VERY HARD

## 2022-08-19 NOTE — PROGRESS NOTES
S: 32 y.o. female here for establish care and depression, bipolar . Zyprexa, depakote. Mom with breast/ovarian cancer in 35s. Needs mammo. Drinks 1-2 beers per day  Vapes. Monogamous. O: VS: /70   Pulse 85   Temp 98.2 °F (36.8 °C) (Temporal)   Ht 5' 8\" (1.727 m)   Wt 160 lb 7 oz (72.8 kg)   LMP 08/01/2022 (Within Weeks)   SpO2 97%   BMI 24.39 kg/m²    General: NAD, alert and interacting appropriately. CV:  RRR, no gallops, rubs, or murmurs    Resp: CTAB   Abd:  Soft, nontender   Ext:  No edema    Impression: establish care. Anxiety, depression, bipolar ?2, FHx breast/ovarian cancer. Plan:   Mammo  CPM bipolar  Rtc 1 mo     Attending Physician Statement  I have discussed the case, including pertinent history and exam findings with the resident. I agree with the documented assessment and plan.

## 2022-08-19 NOTE — PROGRESS NOTES
[facility]  Department of Family Medicine  Family Medicine Residency Program      Patient:  Manuel Carvalho 32 y.o. female     Date of Service: 8/19/22      Chiefcomplaint:   Chief Complaint   Patient presents with    New Patient    Anxiety         History of Present Illness     This is a 64-year-old female here for establish new care. She has a past medical history of depression, bipolar. She currently denies any other past medical history such as diabetes, hypertension or asthma. No past surgical history. She is currently maintained her medications including Zyprexa, Depakote. Does also use Benadryl as needed. She notes a familial history of her mother with breast and ovarian cancer in her early 35s. Patient has not currently had a previous mammogram.  She does drink approximately 1-2 beers per day. Vapes approximately several mL per day. He is approximately 100 mL over 1 month. No other acute concerns at this time. No chest pain, shortness of breath, lightheadedness or dizziness. Allergies:    Patient has no known allergies. Medication List:    Current Outpatient Medications   Medication Sig Dispense Refill    BANOPHEN 25 MG capsule Take 25 mg by mouth daily      [employer] ALLERGY RELIEF 25 MG tablet Take 25 mg by mouth daily      nicotine polacrilex (COMMIT) 2 MG lozenge Take 1 lozenge by mouth every 2 hours as needed for Smoking cessation 100 each 3    OLANZapine (ZYPREXA) 5 MG tablet Take 1 tablet by mouth nightly 30 tablet 0    divalproex (DEPAKOTE) 250 MG DR tablet Take 1 tablet by mouth every 12 hours 60 tablet 0     No current facility-administered medications for this visit. Review of Systems   Constitutional:  Negative for chills and fever. Respiratory:  Negative for cough, shortness of breath and wheezing. Cardiovascular:  Negative for chest pain. Gastrointestinal:  Negative for abdominal pain, diarrhea and vomiting.    Musculoskeletal:  Negative for back pain and neck pain. Skin:  Negative for color change. Neurological:  Negative for dizziness, tremors, weakness and light-headedness. Physical Exam   Physical Exam  Constitutional:       Appearance: She is well-developed. HENT:      Head: Normocephalic. Right Ear: External ear normal.      Left Ear: External ear normal.   Eyes:      Conjunctiva/sclera: Conjunctivae normal.      Pupils: Pupils are equal, round, and reactive to light. Cardiovascular:      Rate and Rhythm: Normal rate and regular rhythm. Heart sounds: Normal heart sounds. No murmur heard. Pulmonary:      Effort: Pulmonary effort is normal. No respiratory distress. Breath sounds: Normal breath sounds. No wheezing or rales. Abdominal:      General: There is no distension. Palpations: Abdomen is soft. Tenderness: There is no abdominal tenderness. There is no guarding or rebound. Musculoskeletal:         General: Normal range of motion. Cervical back: Normal range of motion. Skin:     General: Skin is warm. Findings: No erythema. Neurological:      Mental Status: She is alert and oriented to person, place, and time. Psychiatric:         Behavior: Behavior normal.       Vitals:    08/19/22 1342   BP: 101/70   Pulse: 85   Temp: 98.2 °F (36.8 °C)   SpO2: 97%         Assessment and Plan     1. Establish care  - Reviewed medical chart with patient clinic past medical, past surgical history and medications. 2.  History of anxiety/depression/bipolar  - Continue Zyprexa, Depakote. - Continue following up with [facility] psychiatry for further evaluation and counseling.  - Otherwise appears stable at this time. 3.  Active vaporization practices  - Counseling provided, recommend cessation    4.   Family history of breast cancer/ovarian cancer  - Referral placed for mammogram, will follow-up and place appropriate investigations for patient to be screened given strong family history take into account mother with breast and ovarian cancer in early 35s.       RTO 1 month  Case discussed with Dr. Larisa Braun

## 2022-08-19 NOTE — PATIENT INSTRUCTIONS
[address]  Get Directions  Tel: [phone]  Hours:  Monday  7:30 am - 4:00 pm  Tuesday  7:30 am - 4:00 pm  Wednesday  7:30 am - 4:00 pm  Thursday  7:30 am - 4:00 pm  Friday  7:30 am - 4:00 pm  Saturday  Closed  Sunday  Closed